# Patient Record
Sex: FEMALE | Race: BLACK OR AFRICAN AMERICAN | NOT HISPANIC OR LATINO | URBAN - METROPOLITAN AREA
[De-identification: names, ages, dates, MRNs, and addresses within clinical notes are randomized per-mention and may not be internally consistent; named-entity substitution may affect disease eponyms.]

---

## 2022-06-07 ENCOUNTER — APPOINTMENT (RX ONLY)
Dept: URBAN - METROPOLITAN AREA OTHER 6 | Facility: OTHER | Age: 77
Setting detail: DERMATOLOGY
End: 2022-06-07

## 2022-06-07 VITALS — TEMPERATURE: 97.5 F

## 2022-06-07 DIAGNOSIS — L81.0 POSTINFLAMMATORY HYPERPIGMENTATION: ICD-10-CM

## 2022-06-07 DIAGNOSIS — L259 CONTACT DERMATITIS AND OTHER ECZEMA, UNSPECIFIED CAUSE: ICD-10-CM | Status: INADEQUATELY CONTROLLED

## 2022-06-07 PROBLEM — L30.9 DERMATITIS, UNSPECIFIED: Status: ACTIVE | Noted: 2022-06-07

## 2022-06-07 PROCEDURE — ? PHOTO-DOCUMENTATION

## 2022-06-07 PROCEDURE — ? RECORDS REVIEWED

## 2022-06-07 PROCEDURE — ? COUNSELING

## 2022-06-07 PROCEDURE — 99204 OFFICE O/P NEW MOD 45 MIN: CPT

## 2022-06-07 PROCEDURE — ? PRESCRIPTION

## 2022-06-07 PROCEDURE — ? PRESCRIPTION MEDICATION MANAGEMENT

## 2022-06-07 RX ORDER — HALOBETASOL PROPIONATE 0.5 MG/G
1 CREAM TOPICAL TWICE A DAY
Qty: 50 | Refills: 1 | Status: ERX

## 2022-06-07 ASSESSMENT — LOCATION DETAILED DESCRIPTION DERM
LOCATION DETAILED: LEFT MEDIAL SUPERIOR CHEST
LOCATION DETAILED: UPPER STERNUM
LOCATION DETAILED: LEFT INFERIOR ANTERIOR NECK

## 2022-06-07 ASSESSMENT — LOCATION ZONE DERM
LOCATION ZONE: NECK
LOCATION ZONE: TRUNK

## 2022-06-07 ASSESSMENT — LOCATION SIMPLE DESCRIPTION DERM
LOCATION SIMPLE: LEFT ANTERIOR NECK
LOCATION SIMPLE: CHEST

## 2022-06-07 ASSESSMENT — SEVERITY ASSESSMENT: SEVERITY: MILD TO MODERATE

## 2022-06-07 ASSESSMENT — PAIN INTENSITY VAS: HOW INTENSE IS YOUR PAIN 0 BEING NO PAIN, 10 BEING THE MOST SEVERE PAIN POSSIBLE?: NO PAIN

## 2022-06-07 NOTE — HPI: RASH
What Type Of Note Output Would You Prefer (Optional)?: Bullet Format
Is The Patient Presenting As Previously Scheduled?: No, they are a work-in
How Severe Is Your Rash?: severe
Is This A New Presentation, Or A Follow-Up?: Rash
Additional History: Using dove sensitive skin soap and fragrant moisturizer. \\nUsing gain detergent & dryer sheets. \\nNot sure of the name of shampoo. \\nHad a biopsy May 12, 2021 by [facility]. Had patch testing performed and had a allergy to gold sodium thiosulfate. \\nPrescribed Clobetasol, Halobetasol, Triamcinolone and prescribed doxycycline for about one month for the rash.\\nWears gold jewelry around the neck at times.

## 2022-06-07 NOTE — PROCEDURE: RECORDS REVIEWED
Summary Of Office Notes Reviewed: Previous office notes from previous dermatologist\\npatch testing results revealing reaction to gold sodium thiosulfate.\\nBiopsy results revealing post inflammatory pigment are alternation
Number Of Unique Sources Reviewed: 1
Detail Level: Zone

## 2022-06-07 NOTE — PROCEDURE: PRESCRIPTION MEDICATION MANAGEMENT
Detail Level: Zone
Render In Strict Bullet Format?: No
Plan: Avoid fragrances \\nAvoid wearing necklace around neck strictly for 3-4 weeks. Discussed wearing just once cause cause reaction. Discussed sun protection when outdoors with spf 50 broad spectrum and sun protective clothing when gardening. \\nDiscussed labs at follow up visit if rash is persists dispute discontinue of jewelry and use of topical steroids Ddx: dermatomyositis \\nDenies joint pain or joint swelling. Denies muscle weakness. \\nDrug eruption possible but not likely due to medications (zedia) \\nRx Halobetasol cream to apply to the affected areas on the neck and chest twice a day for two weeks, then discontinue \\nRecheck in three weeks